# Patient Record
Sex: MALE | Race: BLACK OR AFRICAN AMERICAN | Employment: FULL TIME | URBAN - METROPOLITAN AREA
[De-identification: names, ages, dates, MRNs, and addresses within clinical notes are randomized per-mention and may not be internally consistent; named-entity substitution may affect disease eponyms.]

---

## 2022-06-28 ENCOUNTER — APPOINTMENT (OUTPATIENT)
Dept: CT IMAGING | Age: 52
End: 2022-06-28

## 2022-06-28 ENCOUNTER — HOSPITAL ENCOUNTER (EMERGENCY)
Age: 52
Discharge: HOME | End: 2022-06-28
Attending: EMERGENCY MEDICINE

## 2022-06-28 ENCOUNTER — HOSPITAL ENCOUNTER (EMERGENCY)
Dept: GENERAL RADIOLOGY | Age: 52
Discharge: HOME | End: 2022-07-01

## 2022-06-28 VITALS
HEIGHT: 70 IN | SYSTOLIC BLOOD PRESSURE: 109 MMHG | RESPIRATION RATE: 22 BRPM | BODY MASS INDEX: 25.05 KG/M2 | DIASTOLIC BLOOD PRESSURE: 75 MMHG | TEMPERATURE: 98 F | HEART RATE: 57 BPM | WEIGHT: 175 LBS | OXYGEN SATURATION: 98 %

## 2022-06-28 DIAGNOSIS — R55 SYNCOPE AND COLLAPSE: Primary | ICD-10-CM

## 2022-06-28 LAB
ALBUMIN SERPL-MCNC: 3.7 G/DL (ref 3.5–5)
ALBUMIN/GLOB SERPL: 0.9 {RATIO} (ref 1.2–3.5)
ALP SERPL-CCNC: 98 U/L (ref 50–136)
ALT SERPL-CCNC: 36 U/L (ref 12–65)
ANION GAP SERPL CALC-SCNC: 7 MMOL/L (ref 7–16)
AST SERPL-CCNC: 41 U/L (ref 15–37)
BASOPHILS # BLD: 0 K/UL (ref 0–0.2)
BASOPHILS NFR BLD: 0 % (ref 0–2)
BILIRUB SERPL-MCNC: 0.5 MG/DL (ref 0.2–1.1)
BUN SERPL-MCNC: 19 MG/DL (ref 6–23)
CALCIUM SERPL-MCNC: 9.2 MG/DL (ref 8.3–10.4)
CHLORIDE SERPL-SCNC: 103 MMOL/L (ref 98–107)
CO2 SERPL-SCNC: 29 MMOL/L (ref 21–32)
CREAT SERPL-MCNC: 1.2 MG/DL (ref 0.8–1.5)
DIFFERENTIAL METHOD BLD: ABNORMAL
EKG ATRIAL RATE: 88 BPM
EKG DIAGNOSIS: NORMAL
EKG P AXIS: 86 DEGREES
EKG P-R INTERVAL: 138 MS
EKG Q-T INTERVAL: 370 MS
EKG QRS DURATION: 100 MS
EKG QTC CALCULATION (BAZETT): 447 MS
EKG R AXIS: 81 DEGREES
EKG T AXIS: 66 DEGREES
EKG VENTRICULAR RATE: 88 BPM
EOSINOPHIL # BLD: 0.1 K/UL (ref 0–0.8)
EOSINOPHIL NFR BLD: 1 % (ref 0.5–7.8)
ERYTHROCYTE [DISTWIDTH] IN BLOOD BY AUTOMATED COUNT: 14.2 % (ref 11.9–14.6)
GLOBULIN SER CALC-MCNC: 4 G/DL (ref 2.3–3.5)
GLUCOSE SERPL-MCNC: 185 MG/DL (ref 65–100)
HCT VFR BLD AUTO: 42.8 % (ref 41.1–50.3)
HGB BLD-MCNC: 14.4 G/DL (ref 13.6–17.2)
IMM GRANULOCYTES # BLD AUTO: 0 K/UL (ref 0–0.5)
IMM GRANULOCYTES NFR BLD AUTO: 0 % (ref 0–5)
LYMPHOCYTES # BLD: 2.7 K/UL (ref 0.5–4.6)
LYMPHOCYTES NFR BLD: 31 % (ref 13–44)
MCH RBC QN AUTO: 30.8 PG (ref 26.1–32.9)
MCHC RBC AUTO-ENTMCNC: 33.6 G/DL (ref 31.4–35)
MCV RBC AUTO: 91.5 FL (ref 79.6–97.8)
MONOCYTES # BLD: 0.3 K/UL (ref 0.1–1.3)
MONOCYTES NFR BLD: 3 % (ref 4–12)
NEUTS SEG # BLD: 5.7 K/UL (ref 1.7–8.2)
NEUTS SEG NFR BLD: 65 % (ref 43–78)
NRBC # BLD: 0 K/UL (ref 0–0.2)
PLATELET # BLD AUTO: 306 K/UL (ref 150–450)
PMV BLD AUTO: 10.2 FL (ref 9.4–12.3)
POTASSIUM SERPL-SCNC: 3.3 MMOL/L (ref 3.5–5.1)
PROT SERPL-MCNC: 7.7 G/DL (ref 6.3–8.2)
RBC # BLD AUTO: 4.68 M/UL (ref 4.23–5.6)
SODIUM SERPL-SCNC: 139 MMOL/L (ref 138–145)
WBC # BLD AUTO: 8.8 K/UL (ref 4.3–11.1)

## 2022-06-28 PROCEDURE — 85025 COMPLETE CBC W/AUTO DIFF WBC: CPT

## 2022-06-28 PROCEDURE — 93005 ELECTROCARDIOGRAM TRACING: CPT | Performed by: EMERGENCY MEDICINE

## 2022-06-28 PROCEDURE — 70450 CT HEAD/BRAIN W/O DYE: CPT

## 2022-06-28 PROCEDURE — 99285 EMERGENCY DEPT VISIT HI MDM: CPT

## 2022-06-28 PROCEDURE — 71045 X-RAY EXAM CHEST 1 VIEW: CPT

## 2022-06-28 PROCEDURE — 80053 COMPREHEN METABOLIC PANEL: CPT

## 2022-06-28 ASSESSMENT — ENCOUNTER SYMPTOMS
SHORTNESS OF BREATH: 0
COUGH: 0
ABDOMINAL PAIN: 0

## 2022-06-28 ASSESSMENT — PAIN SCALES - GENERAL: PAINLEVEL_OUTOF10: 0

## 2022-06-28 ASSESSMENT — PAIN - FUNCTIONAL ASSESSMENT: PAIN_FUNCTIONAL_ASSESSMENT: 0-10

## 2022-06-28 NOTE — ED TRIAGE NOTES
Patient presents with complaints of headache for the past couple days and patient states that when he stood up today he lost consciousness. Patient denies striking his head. Patient denies any fever. Patient denies history of high blood pressure or BP medications. Patient denies chest pain/shortness of breath.   Patient states that he has had decreased po intake d/t not feeling hungry

## 2022-06-28 NOTE — ED NOTES
I have reviewed discharge instructions with the patient. The patient verbalized understanding. Patient left ED via Discharge Method: ambulatory to Home with (self    Opportunity for questions and clarification provided. Patient given 0 scripts. To continue your aftercare when you leave the hospital, you may receive an automated call from our care team to check in on how you are doing. This is a free service and part of our promise to provide the best care and service to meet your aftercare needs.  If you have questions, or wish to unsubscribe from this service please call [phone]. Thank you for Choosing our [facility] Emergency Department.         Munira Caceres RN  06/28/22 1221

## 2022-06-28 NOTE — Clinical Note
Huma Chapman was seen and treated in our emergency department on 6/28/2022. He may return to work on 06/29/2022. If you have any questions or concerns, please don't hesitate to call.       Sonia Reyes MD

## 2022-06-28 NOTE — Clinical Note
Sheila King was seen and treated in our emergency department on 6/28/2022. He may return to work on 06/29/2022. If you have any questions or concerns, please don't hesitate to call.       Marcos Newberry MD

## 2022-07-09 ENCOUNTER — HOSPITAL ENCOUNTER (EMERGENCY)
Age: 52
Discharge: HOME | End: 2022-07-09
Attending: EMERGENCY MEDICINE

## 2022-07-09 ENCOUNTER — HOSPITAL ENCOUNTER (EMERGENCY)
Dept: GENERAL RADIOLOGY | Age: 52
Discharge: HOME | End: 2022-07-12

## 2022-07-09 VITALS
RESPIRATION RATE: 18 BRPM | TEMPERATURE: 98.2 F | BODY MASS INDEX: 25.05 KG/M2 | OXYGEN SATURATION: 98 % | SYSTOLIC BLOOD PRESSURE: 116 MMHG | WEIGHT: 175 LBS | HEIGHT: 70 IN | HEART RATE: 50 BPM | DIASTOLIC BLOOD PRESSURE: 82 MMHG

## 2022-07-09 DIAGNOSIS — R07.81 RIB PAIN ON LEFT SIDE: ICD-10-CM
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
## 2022-07-09 DIAGNOSIS — M25.561 ACUTE PAIN OF RIGHT KNEE: ICD-10-CM

## 2022-07-09 PROCEDURE — 99284 EMERGENCY DEPT VISIT MOD MDM: CPT

## 2022-07-09 PROCEDURE — 6360000002 HC RX W HCPCS: Performed by: NURSE PRACTITIONER

## 2022-07-09 PROCEDURE — 6370000000 HC RX 637 (ALT 250 FOR IP): Performed by: NURSE PRACTITIONER

## 2022-07-09 PROCEDURE — 71101 X-RAY EXAM UNILAT RIBS/CHEST: CPT

## 2022-07-09 PROCEDURE — 73562 X-RAY EXAM OF KNEE 3: CPT

## 2022-07-09 PROCEDURE — 96372 THER/PROPH/DIAG INJ SC/IM: CPT

## 2022-07-09 RX ORDER — ACETAMINOPHEN 500 MG
1000 TABLET ORAL
Status: COMPLETED | OUTPATIENT
Start: 2022-07-09 | End: 2022-07-09

## 2022-07-09 RX ORDER — LIDOCAINE 4 G/G
1 PATCH TOPICAL DAILY
Status: DISCONTINUED | OUTPATIENT
Start: 2022-07-09 | End: 2022-07-09 | Stop reason: HOSPADM

## 2022-07-09 RX ORDER — DICLOFENAC SODIUM 75 MG/1
75 TABLET, DELAYED RELEASE ORAL 2 TIMES DAILY
Qty: 30 TABLET | Refills: 0 | Status: SHIPPED | OUTPATIENT
Start: 2022-07-09

## 2022-07-09 RX ORDER — LIDOCAINE 4 G/G
1 PATCH TOPICAL DAILY
Qty: 10 PATCH | Refills: 0 | Status: SHIPPED | OUTPATIENT
Start: 2022-07-09 | End: 2022-07-19

## 2022-07-09 RX ORDER — KETOROLAC TROMETHAMINE 30 MG/ML
30 INJECTION, SOLUTION INTRAMUSCULAR; INTRAVENOUS ONCE
Status: COMPLETED | OUTPATIENT
Start: 2022-07-09 | End: 2022-07-09

## 2022-07-09 RX ADMIN — KETOROLAC TROMETHAMINE 30 MG: 30 INJECTION, SOLUTION INTRAMUSCULAR at 08:38

## 2022-07-09 RX ADMIN — ACETAMINOPHEN 1000 MG: 500 TABLET, FILM COATED ORAL at 08:38

## 2022-07-09 ASSESSMENT — PAIN DESCRIPTION - ORIENTATION: ORIENTATION: LEFT

## 2022-07-09 ASSESSMENT — PAIN SCALES - GENERAL
PAINLEVEL_OUTOF10: 9
PAINLEVEL_OUTOF10: 9

## 2022-07-09 ASSESSMENT — ENCOUNTER SYMPTOMS
NAUSEA: 0
BOWEL INCONTINENCE: 0
VISUAL CHANGE: 0
VOMITING: 0
ABDOMINAL PAIN: 0

## 2022-07-09 ASSESSMENT — PAIN DESCRIPTION - LOCATION
LOCATION: RIB CAGE;KNEE
LOCATION: RIB CAGE

## 2022-07-09 ASSESSMENT — PAIN - FUNCTIONAL ASSESSMENT: PAIN_FUNCTIONAL_ASSESSMENT: 0-10

## 2022-07-09 NOTE — ED TRIAGE NOTES
Pt presents from [facility] c/o left rib pain and right knee pain after a fall last night. States he slipped in the rain and fell against a concrete pillar. Denies LOC. Tenderness to left ribs and difficulty walking on right knee.

## 2022-07-09 NOTE — ED PROVIDER NOTES
[facility] Emergency Department Provider Note                   PCP:                None Provider               Age: 47 y.o. Sex: male       ICD-10-CM    1. Fall, initial encounter  [address] XXXA    2. Acute pain of right knee  M25.561    3. Rib pain on left side  R07.81        DISPOSITION Decision To Discharge 07/09/2022 08:59:45 AM        MDM  Number of Diagnoses or Management Options  Acute pain of right knee: new, needed workup  Fall, initial encounter: new, needed workup  Rib pain on left side: new, needed workup  Diagnosis management comments: Otherwise healthy 58-year-old male who presents to the emergency department today for complaint of rib pain and knee pain after a fall. He has some tenderness on exam but otherwise his physical exam is unremarkable. He is able to ambulate with normal, steady gait but reports pain in his knee when he ambulates. X-rays are negative for acute process. Encouraged rest, elevation, ice, and NSAID. Encouraged follow-up with primary care. I have discussed the results of all labs, procedures, radiographs, and/or treatments with the patient and available family members. Treatment plan is agreed upon by the patient and the patient is ready for discharge. Questions about treatment in the ED and differential diagnosis of presenting condition were answered. Patient was given verbal discharge instructions including, but not limited to, importance of returning to the emergency department for any concern of worsening or continued symptoms. Instructions were given to follow-up with a primary care provider or specialist within 1 to 2 days. Adverse effects of medications, if prescribed, were discussed and the patient was advised to refrain from significant physical activity until followed up by a primary care physician and to not drive or operate heavy machinery after taking any sedating substances.       Risk of Complications, Morbidity, and/or Mortality  Presenting problems: low  Diagnostic procedures: low  Management options: low    Patient Progress  Patient progress: improved       Orders Placed This Encounter   Procedures    XR RIBS LEFT INCLUDE CHEST (MIN 3 VIEWS)    XR KNEE RIGHT (3 VIEWS)        Alex Jean is a 47 y.o. male who presents to the Emergency Department with chief complaint of    Chief Complaint   Patient presents with   Chiang Fall      54-year-old male who presents to the emergency department today with complaint of left-sided rib pain and right knee pain after a fall. Patient states that yesterday he was walking and the ground was wet, causing him to slip and fall. He denies head injury, loss of consciousness, neck pain, or back pain. He was able to get himself up and has been ambulatory since the fall. He denies any treatment prior to arrival to the emergency department. He states that he would like something for pain and something to eat. He denies any fever, chills, chest pain, abdominal pain, shortness of breath, or any other complaints today. The history is provided by the patient. Fall  The accident occurred yesterday. The fall occurred while walking. He landed on concrete. There was no blood loss. The point of impact was the right knee. The pain is present in the right knee. The pain is at a severity of 9/10. The pain is moderate. He was ambulatory at the scene. There was no entrapment after the fall. There was no drug use involved in the accident. There was no alcohol use involved in the accident. Pertinent negatives include no visual change, no fever, no numbness, no abdominal pain, no bowel incontinence, no nausea, no vomiting, no headaches and no loss of consciousness. The symptoms are aggravated by pressure on the injury and ambulation. He has tried nothing for the symptoms. Review of Systems   Constitutional: Negative for fever. Gastrointestinal: Negative for abdominal pain, bowel incontinence, nausea and vomiting. Musculoskeletal: Positive for arthralgias. Neurological: Negative for loss of consciousness, numbness and headaches. All other systems reviewed and are negative. Past Medical History:   Diagnosis Date    Herniated disc, cervical         No past surgical history on file. No family history on file. Social Connections:     Frequency of Communication with Friends and Family: Not on file    Frequency of Social Gatherings with Friends and Family: Not on file    Attends Hindu Services: Not on file    Active Member of Clubs or Organizations: Not on file    Attends Club or Organization Meetings: Not on file    Marital Status: Not on file        No Known Allergies     Vitals signs and nursing note reviewed. Patient Vitals for the past 4 hrs:   Temp Pulse Resp BP SpO2   07/09/22 0617 98.2 °F (36.8 °C) 50 18 116/82 98 %          Physical Exam  Vitals and nursing note reviewed. Constitutional:       General: He is not in acute distress. Appearance: Normal appearance. He is not ill-appearing, toxic-appearing or diaphoretic. HENT:      Head: Normocephalic and atraumatic. Right Ear: External ear normal.      Left Ear: External ear normal.      Nose: Nose normal.   Eyes:      Extraocular Movements: Extraocular movements intact. Conjunctiva/sclera: Conjunctivae normal.   Cardiovascular:      Rate and Rhythm: Normal rate. Pulmonary:      Effort: Pulmonary effort is normal. No respiratory distress. Chest:      Chest wall: Tenderness present. No deformity or crepitus. Abdominal:      General: Abdomen is flat. There is no distension. Musculoskeletal:         General: Normal range of motion. Cervical back: Normal range of motion. Comments: Tenderness with palpation to medial right knee. Patient exhibits full range of motion of the knee. No swelling or erythema noted.   Exam is limited due to patient's reported amount of pain at time of exam.   Skin:     General: Skin is warm and dry. Capillary Refill: Capillary refill takes less than 2 seconds. Neurological:      General: No focal deficit present. Mental Status: He is alert and oriented to person, place, and time. Psychiatric:         Mood and Affect: Mood normal.         Behavior: Behavior normal.         Thought Content: Thought content normal.         Judgment: Judgment normal.          Procedures      Labs Reviewed - No data to display     XR RIBS LEFT INCLUDE CHEST (MIN 3 VIEWS)   Final Result   No acute, displaced left rib fractures. There is a small band of atelectasis or scarring in the left lower lung. XR KNEE RIGHT (3 VIEWS)   Final Result   No evidence of acute fracture or dislocation within the right knee. ED Course as of 07/09/22 0931   Sat Jul 09, 2022   0806 XR RIBS LEFT INCLUDE CHEST (MIN 3 VIEWS)  IMPRESSION:  No acute, displaced left rib fractures.     There is a small band of atelectasis or scarring in the left lower lung.    [BC]   0806 XR KNEE RIGHT (3 VIEWS)  IMPRESSION:  No evidence of acute fracture or dislocation within the right knee.    [BC]      ED Course User Index  [BC] EMILY Moreno - CNP        Voice dictation software was used during the making of this note. This software is not perfect and grammatical and other typographical errors may be present. This note has not been completely proofread for errors.        EMILY Moreno - [facility]  07/09/22 7515

## 2022-07-09 NOTE — ED NOTES
[facility] given to pt. I have reviewed discharge instructions with the patient. The patient verbalized understanding. Patient left ED via Discharge Method: wheelchair to Home with self. Opportunity for questions and clarification provided. Patient given 2 scripts. To continue your aftercare when you leave the hospital, you may receive an automated call from our care team to check in on how you are doing. This is a free service and part of our promise to provide the best care and service to meet your aftercare needs.  If you have questions, or wish to unsubscribe from this service please call [phone]. Thank you for Choosing our [facility] Emergency Department.         Samira Mejía RN  07/09/22 9004

## 2024-03-12 NOTE — ED PROVIDER NOTES
[facility] Nurse Inpatient Assessment     Consulted for: Sacrum and Left arm skin tears    Summary: Deep tissue pressure injury present on admission to sacrum. Left arm skin tears present on admission    Patient History (according to provider note(s):      89 year old male with a past medical history significant for lung cancer s/p wedge resection, recurrent c.diff, afib, chronic obstructive pulmonary disease, pulmonary embolism, aortic stenosis s/p bioprosthetic heart valve, chronic kidney disease admitted on 3/7/2024 for further evaluation of altered mental status     Assessment:      Areas visualized during today's visit: Sacrum/coccyx and left arm    Pressure Injury Location: Sacrum    Last photo: 3/12/24    3/8/24    Wound type: Pressure Injury     Pressure Injury Stage: Deep Tissue Pressure Injury (DTPI), present on admission        Wound history/plan of care:   Patient was up in chair for at least 24 hours at home. Patient alert to self.     Wound base: 50% non-blanchable, purple, epidermis, and and tan non blanchable epidermis , 40 % blister and dermis 10% slough     Palpation of the wound bed: normal      Drainage: moderate     Description of drainage: serous     Measurements (length x width x depth, in cm) 9  x 9  x  0.2 cm      Tunneling N/A     Undermining N/A  Periwound skin: Intact      Color: pink      Temperature: normal   Odor: none  Pain: no grimacing or signs of discomfort, none  Pain intervention prior to dressing change: patient tolerated well and slow and gentle cares   Treatment goal: Heal  and Protection  STATUS: evolving  Supplies ordered: supplies stored on unit, discussed with RN, and discussed with patient     My PI Risk Assessment     Sensory Perception: 2 - Very Limited     Moisture: 2 - Very moist      Activity: 3 - Walks occasionally      Mobility: 3 - Slightly limited      Nutrition: 2 - Probably inadequate      Friction/Shear: 1 - Problem     TOTAL:  [employer] Emergency Department Provider Note                   PCP:                None Provider               Age: 47 y.o. Sex: male     No diagnosis found. DISPOSITION         New Prescriptions    No medications on file       Orders Placed This Encounter   Procedures    XR CHEST PORTABLE    CT HEAD WO CONTRAST    CBC with Auto Differential    Comprehensive Metabolic Panel    Cardiac Monitor - ED Only    Continuous Pulse Oximetry    Orthostatic blood pressure and pulse    EKG 12 Lead        Nasim Trinidad MD 12:45 PM      MDM  Number of Diagnoses or Management Options  Diagnosis management comments: Blood work is unremarkable except for hyperglycemia 185 without DKA. CT head shows no acute abnormality. Chest x-ray normal.  EKG shows normal sinus rhythm nonspecific T wave abnormality. Review of medical record shows patient had a normal stress test in 2017. At this time he appears very comfortable with reassuring work-up. He appears safe for discharge home. Because of the mild EKG abnormality will give referral to cardiology. Amount and/or Complexity of Data Reviewed  Clinical lab tests: ordered and reviewed  Tests in the radiology section of CPT®: ordered and reviewed  Independent visualization of images, tracings, or specimens: yes    Risk of Complications, Morbidity, and/or Mortality  Presenting problems: moderate  Diagnostic procedures: moderate  Management options: zulma Johnson is a 47 y.o. male who presents to the Emergency Department with chief complaint of    Chief Complaint   Patient presents with    Headache    Loss of Consciousness      59-year-old -American male no known medical history states that yesterday around 2 PM he was getting ready to go to work and he had a brief syncopal episode. States that he was out only few seconds. No chest pain or shortness of breath. No injury. States this morning he woke up with a headache.   Says for the "13       Wound location: Left arm skin tears    Last photo: 3/8/24  Left elbow    Left hand      Wound due to: Skin Tear  Wound history/plan of care: Wounds present on admission  Wound base:Left elbow 100 %  tan fibrinous tissue  1.cm x 1.5cm x 0.1cm, Left hand pink slick tissue with approximately 10% slough in a 6.3cm x 1cm x 0.3cm area     Palpation of the wound bed: normal      Drainage: small     Description of drainage: serosanguinous     Measurements (length x width x depth, in cm): see above     Tunneling: N/A     Undermining: N/A  Periwound skin: Intact      Color: normal and consistent with surrounding tissue      Temperature: normal   Odor: none  Pain: no grimacing or signs of discomfort, none  Pain interventions prior to dressing change: patient tolerated well  Treatment goal: Heal  and Decrease moisture  STATUS: improving  Supplies ordered: supplies stored on unit, discussed with RN, and discussed with patient       Treatment Plan:     Sacrum wound: Daily   1. Clean wound with saline or MicroKlenz Spray, pat dry  2. Wipe / \"clean\" the surrounding periwound tissue with skin prep (Cavilon No Sting Skin Prep #[id number]) and allow to dry. This will help protect periwound and help dressing adherence  3. Apply Aquacel Ag (144911) to wound bed.  4. Press a Mepilex Sacral to the area, making sure to conform nicely to skin curvatures.   5. Time and date dressing change    Left elbow wound: Q5D  Cleanse with wound cleanser. Pat dry.   Cover with oil emulsion gauze.  Secure with optifoam gentle with bordered dressing.  Time and date    Left hand wound: Every other day  Moisten silver alginate with saline for easy removal  Cleanse with wound cleanser. Pat dry.  Lightly moisten Hydrofera blue with saline and apply (321945) to wound bed.  Cover with optifoam gentle with bordered dressing   Time and date.    Pressure Injury Prevention (PIP) Plan:  If patient is declining pressure injury prevention interventions: Explore " past 2 or 3 days he has been having headaches. Says the headaches are mild. No vision changes. No nausea, vomiting, fever. The history is provided by the patient. Review of Systems   Constitutional: Negative for fever. HENT: Negative for congestion. Respiratory: Negative for cough and shortness of breath. Gastrointestinal: Negative for abdominal pain. Neurological: Positive for headaches. All other systems reviewed and are negative. Past Medical History:   Diagnosis Date    Herniated disc, cervical         History reviewed. No pertinent surgical history. History reviewed. No pertinent family history. Social Connections:     Frequency of Communication with Friends and Family: Not on file    Frequency of Social Gatherings with Friends and Family: Not on file    Attends Orthodox Services: Not on file    Active Member of Clubs or Organizations: Not on file    Attends Club or Organization Meetings: Not on file    Marital Status: Not on file        No Known Allergies     Vitals signs and nursing note reviewed. Patient Vitals for the past 4 hrs:   Temp Pulse Resp BP SpO2   06/28/22 1042 98.4 °F (36.9 °C) 95 16 118/81 97 %          Physical Exam  Vitals and nursing note reviewed. Constitutional:       General: He is not in acute distress. Appearance: Normal appearance. He is not toxic-appearing. HENT:      Head: Normocephalic and atraumatic. Nose: Nose normal.      Mouth/Throat:      Mouth: Mucous membranes are moist.      Pharynx: Oropharynx is clear. Eyes:      Extraocular Movements: Extraocular movements intact. Conjunctiva/sclera: Conjunctivae normal.      Pupils: Pupils are equal, round, and reactive to light. Cardiovascular:      Rate and Rhythm: Normal rate and regular rhythm. Pulmonary:      Effort: Pulmonary effort is normal.      Breath sounds: Normal breath sounds. Abdominal:      General: There is no distension.       Palpations: "reason why and address patient's concerns, Educate on pressure injury risk and prevention intervention(s), If patient is still declining, document \"informed refusal\" , and Ensure Care team is aware ( provider, charge nurse, etc)  Mattress: Follow bed algorithm, reassess daily and order specialty mattress, if indicated.  HOB: Maintain at or below 30 degrees, unless contraindicated  Repositioning in bed: Every 1-2 hours , Left/right positioning; avoid supine, and Raise foot of bed prior to raising head of bed, to reduce patient sliding down (shear)  Heels: Keep elevated off mattress and Pillows under calves  Protective Dressing: Sacral Mepilex for prevention (#[id number]),  especially for the agitated patient   Positioning Equipment: None  Chair positioning: Chair cushion (#[id number])  and Assist patient to reposition hourly   If patient has a buttock pressure injury, or high risk for PI use chair cushion or SPS.  Moisture Management: Perineal cleansing /protection: Follow Incontinence Protocol, Avoid brief in bed, Clean and dry skin folds with bathing , Consider InterDry (#855082) between folds, and Moisturize dry skin  Under Devices: Inspect skin under all medical devices during skin inspection , Ensure tubes are stabilized without tension, and Ensure patient is not lying on medical devices or equipment when repositioned  Ask provider to discontinue device when no longer needed.         Orders: Written    RECOMMEND PRIMARY TEAM ORDER: None, at this time  Education provided: importance of repositioning, plan of care, and Off-loading pressure  Discussed plan of care with: Patient and Nurse  WOC nurse follow-up plan: weekly  Notify WOC if wound(s) deteriorate.  Nursing to notify the Provider(s) and re-consult the WOC Nurse if new skin concern.    DATA:     Current support surface: Standard  Standard gel/foam mattress (IsoFlex, Atmos air, etc)  Containment of urine/stool: Incontinent pad in bed  BMI: Body mass index is 18.82 " Abdomen is soft. Tenderness: There is no abdominal tenderness. There is no guarding. Musculoskeletal:         General: No swelling or tenderness. Normal range of motion. Cervical back: Normal range of motion and neck supple. Skin:     General: Skin is warm and dry. Neurological:      General: No focal deficit present. Mental Status: He is alert and oriented to person, place, and time. Psychiatric:         Mood and Affect: Mood normal.         Behavior: Behavior normal.          EKG 12 Lead    Date/Time: 6/28/2022 12:50 PM  Performed by: Marleen Adrian MD  Authorized by: Marleen Adrian MD     ECG reviewed by ED Physician in the absence of a cardiologist: yes    Interpretation:     Interpretation: abnormal    Rate:     ECG rate assessment: normal    Rhythm:     Rhythm: sinus rhythm    Ectopy:     Ectopy: none    QRS:     QRS axis:  Normal  Conduction:     Conduction: normal    ST segments:     ST segments:  Normal  T waves:     T waves: non-specific    Other findings:     Other findings: LVH            Labs Reviewed   CBC WITH AUTO DIFFERENTIAL - Abnormal; Notable for the following components:       Result Value    Monocytes 3 (*)     All other components within normal limits   COMPREHENSIVE METABOLIC PANEL - Abnormal; Notable for the following components:    Potassium 3.3 (*)     Glucose 185 (*)     AST 41 (*)     Globulin 4.0 (*)     Albumin/Globulin Ratio 0.9 (*)     All other components within normal limits        CT HEAD WO CONTRAST   Final Result   No CT evidence of acute intracranial abnormality. XR CHEST PORTABLE   Final Result   No acute findings in the chest.                                Voice dictation software was used during the making of this note. This software is not perfect and grammatical and other typographical errors may be present. This note has not been completely proofread for errors.      Marleen Adrian MD  06/28/22 9767 kg/m .   Active diet order: Orders Placed This Encounter      Combination Diet Regular Diet Adult; Soft and Bite Sized Diet (level 6); Slightly Thick (level 1)     Output: I/O last 3 completed shifts:  In: 150 [P.O.:150]  Out: 345 [Urine:345]     Labs:   Recent Labs   Lab 03/12/24  0833 03/08/24  1107 03/07/24  1256   ALBUMIN  --   --  3.0*   HGB 9.1*   < > 11.1*   WBC 5.5   < > 7.1    < > = values in this interval not displayed.     Pressure injury risk assessment:   Sensory Perception: 3-->slightly limited  Moisture: 3-->occasionally moist  Activity: 3-->walks occasionally  Mobility: 3-->slightly limited  Nutrition: 2-->probably inadequate  Friction and Shear: 1-->problem  Jamin Score: 15    Sophia Browne CWOCN   Dept. Vocera- Contact [facility] Nurse (Padmini) via  Vocera   Dept. Office Number: [phone]